# Patient Record
Sex: MALE | Race: BLACK OR AFRICAN AMERICAN | NOT HISPANIC OR LATINO | Employment: PART TIME | URBAN - METROPOLITAN AREA
[De-identification: names, ages, dates, MRNs, and addresses within clinical notes are randomized per-mention and may not be internally consistent; named-entity substitution may affect disease eponyms.]

---
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
## 2025-07-31 ENCOUNTER — HOSPITAL ENCOUNTER (EMERGENCY)
Facility: HOSPITAL | Age: 20
Discharge: HOME | End: 2025-07-31
Attending: EMERGENCY MEDICINE
Payer: COMMERCIAL

## 2025-07-31 ENCOUNTER — APPOINTMENT (OUTPATIENT)
Dept: RADIOLOGY | Facility: HOSPITAL | Age: 20
End: 2025-07-31
Payer: COMMERCIAL

## 2025-07-31 VITALS
HEIGHT: 68 IN | OXYGEN SATURATION: 98 % | DIASTOLIC BLOOD PRESSURE: 82 MMHG | TEMPERATURE: 98.1 F | RESPIRATION RATE: 15 BRPM | SYSTOLIC BLOOD PRESSURE: 127 MMHG | WEIGHT: 130 LBS | BODY MASS INDEX: 19.7 KG/M2 | HEART RATE: 78 BPM

## 2025-07-31 DIAGNOSIS — N30.90 CYSTITIS: ICD-10-CM

## 2025-07-31 DIAGNOSIS — R17 ELEVATED BILIRUBIN: ICD-10-CM

## 2025-07-31 DIAGNOSIS — R10.10 PAIN OF UPPER ABDOMEN: Primary | ICD-10-CM

## 2025-07-31 LAB
ALBUMIN SERPL BCP-MCNC: 4.6 G/DL (ref 3.4–5)
ALP SERPL-CCNC: 56 U/L (ref 33–120)
ALT SERPL W P-5'-P-CCNC: 22 U/L (ref 10–52)
AMORPH CRY #/AREA UR COMP ASSIST: ABNORMAL /HPF
ANION GAP SERPL CALC-SCNC: 9 MMOL/L (ref 10–20)
APPEARANCE UR: CLEAR
AST SERPL W P-5'-P-CCNC: 39 U/L (ref 9–39)
BACTERIA #/AREA URNS AUTO: ABNORMAL /HPF
BASOPHILS # BLD AUTO: 0.02 X10*3/UL (ref 0–0.1)
BASOPHILS NFR BLD AUTO: 0.2 %
BILIRUB SERPL-MCNC: 3.2 MG/DL (ref 0–1.2)
BILIRUB UR STRIP.AUTO-MCNC: NEGATIVE MG/DL
BUN SERPL-MCNC: 9 MG/DL (ref 6–23)
CALCIUM SERPL-MCNC: 8.8 MG/DL (ref 8.6–10.3)
CHLORIDE SERPL-SCNC: 105 MMOL/L (ref 98–107)
CO2 SERPL-SCNC: 30 MMOL/L (ref 21–32)
COLOR UR: YELLOW
CREAT SERPL-MCNC: 0.79 MG/DL (ref 0.5–1.3)
EGFRCR SERPLBLD CKD-EPI 2021: >90 ML/MIN/1.73M*2
EOSINOPHIL # BLD AUTO: 0.09 X10*3/UL (ref 0–0.7)
EOSINOPHIL NFR BLD AUTO: 1.1 %
ERYTHROCYTE [DISTWIDTH] IN BLOOD BY AUTOMATED COUNT: 15.2 % (ref 11.5–14.5)
GLUCOSE SERPL-MCNC: 113 MG/DL (ref 74–99)
GLUCOSE UR STRIP.AUTO-MCNC: NORMAL MG/DL
HCT VFR BLD AUTO: 33.4 % (ref 41–52)
HGB BLD-MCNC: 11.4 G/DL (ref 13.5–17.5)
HIV 1+2 AB+HIV1 P24 AG SERPL QL IA: NONREACTIVE
IMM GRANULOCYTES # BLD AUTO: 0.02 X10*3/UL (ref 0–0.7)
IMM GRANULOCYTES NFR BLD AUTO: 0.2 % (ref 0–0.9)
KETONES UR STRIP.AUTO-MCNC: NEGATIVE MG/DL
LEUKOCYTE ESTERASE UR QL STRIP.AUTO: ABNORMAL
LIPASE SERPL-CCNC: 39 U/L (ref 9–82)
LYMPHOCYTES # BLD AUTO: 3.83 X10*3/UL (ref 1.2–4.8)
LYMPHOCYTES NFR BLD AUTO: 45.2 %
MCH RBC QN AUTO: 26.9 PG (ref 26–34)
MCHC RBC AUTO-ENTMCNC: 34.1 G/DL (ref 32–36)
MCV RBC AUTO: 79 FL (ref 80–100)
MONOCYTES # BLD AUTO: 0.45 X10*3/UL (ref 0.1–1)
MONOCYTES NFR BLD AUTO: 5.3 %
MUCOUS THREADS #/AREA URNS AUTO: ABNORMAL /LPF
NEUTROPHILS # BLD AUTO: 4.06 X10*3/UL (ref 1.2–7.7)
NEUTROPHILS NFR BLD AUTO: 48 %
NITRITE UR QL STRIP.AUTO: NEGATIVE
NRBC BLD-RTO: 6.1 /100 WBCS (ref 0–0)
PH UR STRIP.AUTO: 6.5 [PH]
PLATELET # BLD AUTO: 150 X10*3/UL (ref 150–450)
POTASSIUM SERPL-SCNC: 3.6 MMOL/L (ref 3.5–5.3)
PROT SERPL-MCNC: 7 G/DL (ref 6.4–8.2)
PROT UR STRIP.AUTO-MCNC: ABNORMAL MG/DL
RBC # BLD AUTO: 4.24 X10*6/UL (ref 4.5–5.9)
RBC # UR STRIP.AUTO: NEGATIVE MG/DL
RBC #/AREA URNS AUTO: ABNORMAL /HPF
SODIUM SERPL-SCNC: 140 MMOL/L (ref 136–145)
SP GR UR STRIP.AUTO: >1.05
SQUAMOUS #/AREA URNS AUTO: ABNORMAL /HPF
TREPONEMA PALLIDUM IGG+IGM AB [PRESENCE] IN SERUM OR PLASMA BY IMMUNOASSAY: NONREACTIVE
UROBILINOGEN UR STRIP.AUTO-MCNC: ABNORMAL MG/DL
WBC # BLD AUTO: 8.5 X10*3/UL (ref 4.4–11.3)
WBC #/AREA URNS AUTO: ABNORMAL /HPF

## 2025-07-31 PROCEDURE — 2500000004 HC RX 250 GENERAL PHARMACY W/ HCPCS (ALT 636 FOR OP/ED): Mod: JZ | Performed by: EMERGENCY MEDICINE

## 2025-07-31 PROCEDURE — 80053 COMPREHEN METABOLIC PANEL: CPT | Performed by: EMERGENCY MEDICINE

## 2025-07-31 PROCEDURE — 2550000001 HC RX 255 CONTRASTS: Performed by: EMERGENCY MEDICINE

## 2025-07-31 PROCEDURE — 87389 HIV-1 AG W/HIV-1&-2 AB AG IA: CPT | Mod: AHULAB | Performed by: EMERGENCY MEDICINE

## 2025-07-31 PROCEDURE — 85025 COMPLETE CBC W/AUTO DIFF WBC: CPT | Performed by: EMERGENCY MEDICINE

## 2025-07-31 PROCEDURE — 86780 TREPONEMA PALLIDUM: CPT | Mod: AHULAB | Performed by: EMERGENCY MEDICINE

## 2025-07-31 PROCEDURE — 96361 HYDRATE IV INFUSION ADD-ON: CPT | Performed by: EMERGENCY MEDICINE

## 2025-07-31 PROCEDURE — 87086 URINE CULTURE/COLONY COUNT: CPT | Mod: AHULAB | Performed by: EMERGENCY MEDICINE

## 2025-07-31 PROCEDURE — 74177 CT ABD & PELVIS W/CONTRAST: CPT | Mod: FOREIGN READ | Performed by: RADIOLOGY

## 2025-07-31 PROCEDURE — 96374 THER/PROPH/DIAG INJ IV PUSH: CPT | Performed by: EMERGENCY MEDICINE

## 2025-07-31 PROCEDURE — 87491 CHLMYD TRACH DNA AMP PROBE: CPT | Mod: AHULAB | Performed by: EMERGENCY MEDICINE

## 2025-07-31 PROCEDURE — 81001 URINALYSIS AUTO W/SCOPE: CPT | Performed by: EMERGENCY MEDICINE

## 2025-07-31 PROCEDURE — 99285 EMERGENCY DEPT VISIT HI MDM: CPT | Mod: 25 | Performed by: EMERGENCY MEDICINE

## 2025-07-31 PROCEDURE — 74177 CT ABD & PELVIS W/CONTRAST: CPT

## 2025-07-31 PROCEDURE — 36415 COLL VENOUS BLD VENIPUNCTURE: CPT | Performed by: EMERGENCY MEDICINE

## 2025-07-31 PROCEDURE — 83690 ASSAY OF LIPASE: CPT | Performed by: EMERGENCY MEDICINE

## 2025-07-31 RX ORDER — MORPHINE SULFATE 4 MG/ML
4 INJECTION, SOLUTION INTRAMUSCULAR; INTRAVENOUS ONCE AS NEEDED
Status: COMPLETED | OUTPATIENT
Start: 2025-07-31 | End: 2025-07-31

## 2025-07-31 RX ORDER — DICYCLOMINE HYDROCHLORIDE 10 MG/1
10 CAPSULE ORAL 4 TIMES DAILY
Qty: 28 CAPSULE | Refills: 0 | Status: SHIPPED | OUTPATIENT
Start: 2025-07-31 | End: 2025-08-07

## 2025-07-31 RX ORDER — CEPHALEXIN 500 MG/1
500 CAPSULE ORAL 4 TIMES DAILY
Qty: 40 CAPSULE | Refills: 0 | Status: SHIPPED | OUTPATIENT
Start: 2025-07-31 | End: 2025-08-04 | Stop reason: ALTCHOICE

## 2025-07-31 RX ORDER — ONDANSETRON 4 MG/1
4 TABLET, FILM COATED ORAL EVERY 6 HOURS
Qty: 12 TABLET | Refills: 0 | Status: SHIPPED | OUTPATIENT
Start: 2025-07-31 | End: 2025-08-03

## 2025-07-31 RX ADMIN — IOHEXOL 75 ML: 350 INJECTION, SOLUTION INTRAVENOUS at 04:53

## 2025-07-31 RX ADMIN — SODIUM CHLORIDE 1000 ML: 0.9 INJECTION, SOLUTION INTRAVENOUS at 04:04

## 2025-07-31 RX ADMIN — MORPHINE SULFATE 4 MG: 4 INJECTION, SOLUTION INTRAMUSCULAR; INTRAVENOUS at 04:04

## 2025-07-31 ASSESSMENT — PAIN SCALES - GENERAL
PAINLEVEL_OUTOF10: 8
PAINLEVEL_OUTOF10: 10 - WORST POSSIBLE PAIN
PAINLEVEL_OUTOF10: 0 - NO PAIN
PAINLEVEL_OUTOF10: 0 - NO PAIN

## 2025-07-31 ASSESSMENT — PAIN - FUNCTIONAL ASSESSMENT
PAIN_FUNCTIONAL_ASSESSMENT: 0-10
PAIN_FUNCTIONAL_ASSESSMENT: 0-10

## 2025-07-31 ASSESSMENT — PAIN DESCRIPTION - LOCATION: LOCATION: ABDOMEN

## 2025-07-31 NOTE — ED PROVIDER NOTES
HPI   Chief Complaint   Patient presents with    Abdominal Pain       The patient presents with lower abdominal pain today.  He denies any dysuria or hematuria.  He denies any concerns for STD.  However he is in front of his mother.  Do note that he has a previous visit for STD concerns.  He denies any drug or alcohol abuse.  Denies any vomiting.  States that it has woken him up in sleep the last 2 nights.  He states that he normally was for the morning today was more to in the morning and has been more persistent that he took 50 mg of ibuprofen and has not been improving since then.              Patient History   Medical History[1]  Surgical History[2]  Family History[3]  Social History[4]    Physical Exam   ED Triage Vitals [07/31/25 0327]   Temperature Heart Rate Respirations BP   36.7 °C (98.1 °F) 70 19 125/76      Pulse Ox Temp Source Heart Rate Source Patient Position   100 % Oral Monitor --      BP Location FiO2 (%)     -- --       Physical Exam  Vitals and nursing note reviewed.   Constitutional:       General: He is not in acute distress.     Appearance: He is well-developed.   HENT:      Head: Normocephalic and atraumatic.      Nose: No rhinorrhea.      Mouth/Throat:      Mouth: Mucous membranes are moist.     Eyes:      Conjunctiva/sclera: Conjunctivae normal.       Cardiovascular:      Rate and Rhythm: Normal rate and regular rhythm.      Heart sounds: Normal heart sounds.   Pulmonary:      Effort: Pulmonary effort is normal. No respiratory distress.      Breath sounds: Normal breath sounds.   Abdominal:      Palpations: Abdomen is soft.      Tenderness: There is no abdominal tenderness.     Musculoskeletal:         General: No swelling.      Cervical back: Neck supple.     Skin:     General: Skin is warm.      Capillary Refill: Capillary refill takes less than 2 seconds.     Neurological:      General: No focal deficit present.      Mental Status: He is alert.      Cranial Nerves: No cranial nerve  deficit.     Psychiatric:         Mood and Affect: Mood normal.           ED Course & MDM   Diagnoses as of 07/31/25 6628   Pain of upper abdomen   Cystitis   Elevated bilirubin                 No data recorded     [place] Coma Scale Score: 15 (07/31/25 0332 : Sue Gaona RN)                           Medical Decision Making  The patient's urinalysis is still pending.  Given his prior concern for STD on previous visit I did elect to check him for STDs today considered also bladder infection.  Considered kidney stone given the sudden onset of pain.  CT scan was pending at time of signout to oncoming provider.  Urinalysis was still pending as well.    Procedure  Procedures       [1] No past medical history on file.  [2] No past surgical history on file.  [3] No family history on file.  [4]   Social History  Tobacco Use    Smoking status: Never    Smokeless tobacco: Never   Substance Use Topics    Alcohol use: Never    Drug use: Never        Rohan Dupree MD  07/31/25 5928

## 2025-07-31 NOTE — DISCHARGE INSTRUCTIONS
You were seen in the emergency department for abdominal pain.  Your urinalysis did show some evidence of infection for which we have started an antibiotic called Keflex, take his medication as prescribed.  You were labs did show a mildly elevated bilirubin level, although the remainder of your liver testing was within normal limits we have referred you to our gastroenterology specialist to evaluate this further.  Is also important that you follow-up with your primary care physician for further evaluation of this/repeat laboratory testing.  Return to the emergency department immediately if you experience worsening abdominal pain, vomiting that prevents you from keeping down food or fluids, yellow discoloration of your eyes or skin or any other concerning symptoms.

## 2025-07-31 NOTE — ED TRIAGE NOTES
Pt biba from home with co of ab pain x couple days. Pt took 600 mg ibuprofen with a little relief. No abd surgeries. Denies n/v, cp/sob. Hx enlarged spleen

## 2025-07-31 NOTE — PROGRESS NOTES
Received patient in signout from Dr. Dupree. In short the patient is a 20 y.o. male presenting with abdominal pain. In the ED, CT scan showing nonspecific periportal edema, LFTs were within normal limits but bilirubin is elevated at 3.2, previous labs did show an elevation of 1.9.  On reexamination patient is not jaundiced, has no scleral icterus, notes that abdominal pain has resolved.  Given elevated bilirubin did offer admission for GI consultation/further evaluation and symptom control, patient conveys at pain is currently resolved, did discuss outpatient follow-up with gastroenterology and close follow-up with his primary care physician for repeat laboratory testing, patient would prefer to pursue outpatient evaluation/management.  Urinalysis did show 11-20 white blood cells, gonorrhea/committee all testing is sent and pending, will start oral antibiotics to cover for cystitis and plan for follow-up on STI testing as an outpatient.  Will prescribe Bentyl, Zofran for symptom control as well.  Did discuss need for close follow-up as well as return precautions with patient and mother at bedside. Patient discharged in good condition.

## 2025-08-01 LAB
BACTERIA UR CULT: NORMAL
C TRACH RRNA SPEC QL NAA+PROBE: POSITIVE
HOLD SPECIMEN: NORMAL
N GONORRHOEA DNA SPEC QL PROBE+SIG AMP: NEGATIVE

## 2025-08-02 ENCOUNTER — RESULTS FOLLOW-UP (OUTPATIENT)
Dept: PHARMACY | Facility: HOSPITAL | Age: 20
End: 2025-08-02
Payer: COMMERCIAL

## 2025-08-02 DIAGNOSIS — A74.9 CHLAMYDIA: Primary | ICD-10-CM

## 2025-08-04 RX ORDER — DOXYCYCLINE 100 MG/1
100 CAPSULE ORAL 2 TIMES DAILY
Qty: 14 CAPSULE | Refills: 0 | Status: SHIPPED | OUTPATIENT
Start: 2025-08-04 | End: 2025-08-11

## 2025-08-04 NOTE — TELEPHONE ENCOUNTER
EDPD Note: Initiation     Contacted Travis Thomas regarding a positive chlamydia culture/result that was taken during their recent emergency room visit. I completed education with patient. The patient is not being treated appropriately.    Patient presented to the ED for abdominal pain, denied dysuria or hematuria. Spoke to patient about signs and symptoms including genital discharge, burning sensation when urinating, or pain in the penis, vagina, testicles, or rectum. Patient was encouraged to abstain from sexual intercourse for at least 7-14 days or after completion of treatment. Counseled patient to take doxycycline with food and water.  Patient was also encouraged to talk to their sex partners so they can seek treatment as well. Patient made aware that if they experience any signs/symptoms to go to ED for further evaluation. Made aware to follow up with PCP. Patient verbalized understanding and had no further questions or concerns.   Patient declined EPT, aware to call EDPD back if needed.     The following prescription was sent to the patient's preferred pharmacy. No further follow up needed from EDPD Team.     Drug: Doxycycline 100mg   Sig: BID x7  Qty: 14  Days Supply: 7    C. trachomatis / N. gonorrhoeae, Amplified, Urogenital: [id number]  Order: [id number]   Collected 7/31/2025 06:24    Status: Final result    Test Result Released: Yes (not seen)    1 Result Note    Component  Ref Range & Units    Neisseria gonorrhea,Amplified  Negative Negative   Chlamydia trachomatis, Amplified  Negative Positive Abnormal    Resulting Agency [facility]          If there are any other questions for the ED Post-Discharge Culture Follow Up Team, please contact [phone]. Fax: [phone].    Mattie Jim, PharmD    EDPD Note: Discontinuation:     I reviewed Travis Thomas chart regarding their urine culture/result that was taken during their recent emergency room visit. I completed education with patient. The  antibiotic prescribed at discharge, Keflex 500mg QID x10, was discontinued due to a negative urine  result. Patient verbalized understanding and there were no other questions at this time.     Patient presented to the ED for abdominal pain, denied dysuria or hematuria. Counseled patient to discontinue Keflex due to negative urine culture.     Urine Culture  Order: [id number] - Reflex for Order [id number]   Collected 7/31/2025 06:24    Status: Final result    Test Result Released: Yes (not seen)    Specimen Information: Clean Catch/Voided; Urine   0 Result Notes  Urine Culture Growth indicates contamination with periurethral frank. Repeat culture if clinically indicated.        Resulting Agency: [facility]     Specimen Collected: 07/31/25 06:24 Last Resulted: 08/01/25 10:04     If there are any other questions for the ED Post-Discharge Culture Follow Up Team, please contact [phone]. Fax: [phone].     Mattie Jim, KatarinaD

## 2025-08-29 ENCOUNTER — APPOINTMENT (OUTPATIENT)
Dept: GASTROENTEROLOGY | Facility: CLINIC | Age: 20
End: 2025-08-29
Payer: COMMERCIAL